# Patient Record
Sex: FEMALE | Race: WHITE | NOT HISPANIC OR LATINO | Employment: FULL TIME
[De-identification: names, ages, dates, MRNs, and addresses within clinical notes are randomized per-mention and may not be internally consistent; named-entity substitution may affect disease eponyms.]

---

## 2022-07-24 ENCOUNTER — HEALTH MAINTENANCE LETTER (OUTPATIENT)
Age: 27
End: 2022-07-24

## 2022-09-20 NOTE — PROGRESS NOTES
[facility]  Gynecology Consult Visit    SUBJECTIVE    Nadeen Rowe is a 27 year old nulligravid who is here for discussion of contraception, specifically permanent contraception/sterilization.     Nadeen is present today with her . They do not plan biologic children. She also has AUB. Prior to placement of a Mirena IUD in , she reports periods lasting 7-9 days with 4 days of heavy bleeding, and back pain. The menses have resolved with the placement of the IUD. However the IUD has caused loss of libido and weight gain, and apathy. She continues to have pelvic pain, which she describes as a stabbing pain in lower pelvis occurring monthly.    Her LMP is: No LMP recorded. (Menstrual status: IUD).    PAST MEDICAL HISTORY  Past Medical History:   Diagnosis Date     Factor 5 Leiden mutation, heterozygous (H)     no personal history of DVT     MEDICATIONS  No current outpatient medications on file.     No current facility-administered medications for this visit.       ALLERGIES  No Known Allergies    OBSTETRIC/GYNECOLOGIC HISTORY  Period cycle/length/flow/associated symptoms: None currently due to IUD. Prior to IUD, heavy bleeding and severe back pain.  Current contraception: Mirena IUD  Number of partners in last year: 1 male partner (monogamous)  History of STDs: None  No results found for: PAP 2019, normal. 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 Lives with her , Rg, who is  "a second year pediatric resident.     FAMILY HISTORY    Family History   Problem Relation Age of Onset     Other - See Comments Mother         prothrombin gene mutation     Miscarriages / Stillbirths Mother         4 miscarriages     Pulmonary Embolism Father         FVL homozygosity, on anticoagulation     REVIEW OF SYSTEMS  A 10 point review of systems including Constitutional, Eyes, Respiratory, Cardiovascular, Gastroenterology, Genitourinary, Integumentary, Musculoskeletal, and Psychiatric, were all negative, except for pertinent positives noted in the above HPI.    OBJECTIVE  /78   Pulse 84   Ht 1.702 m (5' 7\")   Wt 68 kg (150 lb)   BMI 23.49 kg/m      General: Alert, without distress  Pelvic: normal external female genitalia; normal vagina without discharge; normal cervix without lesions/masses;  anteverted, mobile, small but without descensus, nontender; adnexae nontender and without masses; normal anus/perineum  Extremities: normal      ASSESSMENT  Nadeen Rowe is a 27 year old nulligravid with AUB and dysmenorrhea currently incompletely managed with a progestin IUD who presents for consultation to discuss hysterectomy and permanent sterilization.    Nadeen is clear that she does not desire biologic children. She understands that sterilization may mean she regrets this choice in the future. She understands that other types of reversible contraception, like an IUD, provide equal contraceptive efficacy without the concomitant risk of surgery. Nadeen has a relative contraindication to estrogen-containing contraceptives (FVL heterozygosity), which limits the types of contraception available to manage menses.     Nadeen is also clear that she dislikes the side effects of the Mirena IUD and that the IUD has not completely resolved the pelvic pain she experiences monthly. Because she does not desire children, would rather not require the use of hormonal contraception for menstrual management for the remaining " decades of fertility. For this reason, she requests a hysterectomy for management of AUB and dysmenorrhea. We discussed alternatives to hysterectomy that are less invasive, including ablation with tubal ligation. We reviewed that an ablation may incompletely resolve heavy and/or painful menses (likely 30% or more given 20 years of remaining fertility/menses). For these reasons, Nadeen continued to prefer hysterectomy for treatment.    We discussed that I recommended removal of the uterus, cervix (to prevent future cervical cancers), and fallopian tubes (to prevent future ovarian cancers). Given her young age, I recommended that Nadeen keep both ovaries to prevent surgical menopause and its associated risks of osteoporosis and heart disease. She is in agreement.    Finally, we discussed route, and that I recommend the most minimally invasive approach to hysterectomy. Given her nulliparity and lack of descensus on exam, we discussed laparoscopic hysterectomy. We reviewed the risks of surgery, including: bleeding/transfusion, infection, injury to bowel/bladder/ureters/nerves/blood vessels requiring repair/reoperation for repair, DVT/PE, poor cosmesis of incisions. We signed the hysterectomy consent together today.      PLAN  -pelvic US for surgical planning. I will call Nadeen with results. She reports a normal pap smear in 2019; will attempt to acquire records prior to scheduling surgery    Nick Owen ([place]) MS3  [facility]    OBGYN Attending Addendum    I appreciate the note above by medical student, Feliz Owen.  I, Hilary Garnica, was present with the medical student, who participated in the service and in the documentation of the note. I have verified the history and personally performed the physical exam and medical decision making. I have edited accordingly and agree with the assessment and plan of care as documented in the note.    Hilary Garnica MD, MSCI    [facility]  9/28/22

## 2022-09-21 ASSESSMENT — ANXIETY QUESTIONNAIRES
7. FEELING AFRAID AS IF SOMETHING AWFUL MIGHT HAPPEN: NOT AT ALL
GAD7 TOTAL SCORE: 0
3. WORRYING TOO MUCH ABOUT DIFFERENT THINGS: NOT AT ALL
1. FEELING NERVOUS, ANXIOUS, OR ON EDGE: NOT AT ALL
5. BEING SO RESTLESS THAT IT IS HARD TO SIT STILL: NOT AT ALL
GAD7 TOTAL SCORE: 0
2. NOT BEING ABLE TO STOP OR CONTROL WORRYING: NOT AT ALL
4. TROUBLE RELAXING: NOT AT ALL
7. FEELING AFRAID AS IF SOMETHING AWFUL MIGHT HAPPEN: NOT AT ALL
GAD7 TOTAL SCORE: 0
6. BECOMING EASILY ANNOYED OR IRRITABLE: NOT AT ALL

## 2022-09-28 ENCOUNTER — OFFICE VISIT (OUTPATIENT)
Dept: OBGYN | Facility: CLINIC | Age: 27
End: 2022-09-28
Attending: OBSTETRICS & GYNECOLOGY
Payer: COMMERCIAL

## 2022-09-28 VITALS
SYSTOLIC BLOOD PRESSURE: 114 MMHG | WEIGHT: 150 LBS | HEIGHT: 67 IN | BODY MASS INDEX: 23.54 KG/M2 | DIASTOLIC BLOOD PRESSURE: 78 MMHG | HEART RATE: 84 BPM

## 2022-09-28 DIAGNOSIS — N94.6 DYSMENORRHEA: Primary | ICD-10-CM

## 2022-09-28 PROCEDURE — 99203 OFFICE O/P NEW LOW 30 MIN: CPT | Performed by: OBSTETRICS & GYNECOLOGY

## 2022-09-28 NOTE — LETTER
2022       RE: Nadeen Rowe  [address] [place] [address]     Dear Colleague,    Thank you for referring your patient, Nadeen Rowe, to the [facility] [place] at [facility]. Please see a copy of my visit note below.    [facility]  Gynecology Consult Visit    SUBJECTIVE    Nadeen Rowe is a 27 year old nulligravid who is here for discussion of contraception, specifically permanent contraception/sterilization.     Nadeen is present today with her . They do not plan biologic children. She also has AUB. Prior to placement of a Mirena IUD in , she reports periods lasting 7-9 days with 4 days of heavy bleeding, and back pain. The menses have resolved with the placement of the IUD. However the IUD has caused loss of libido and weight gain, and apathy. She continues to have pelvic pain, which she describes as a stabbing pain in lower pelvis occurring monthly.    Her LMP is: No LMP recorded. (Menstrual status: IUD).    PAST MEDICAL HISTORY  Past Medical History:   Diagnosis Date     Factor 5 Leiden mutation, heterozygous (H)     no personal history of DVT     MEDICATIONS  No current outpatient medications on file.     No current facility-administered medications for this visit.       ALLERGIES  No Known Allergies    OBSTETRIC/GYNECOLOGIC HISTORY  Period cycle/length/flow/associated symptoms: None currently due to IUD. Prior to IUD, heavy bleeding and severe back pain.  Current contraception: Mirena IUD  Number of partners in last year: 1 male partner (monogamous)  History of STDs: None  No results found for: PAP , normal. 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 Lives with her , Rg, who is a second year pediatric resident.     FAMILY HISTORY    Family History   Problem Relation Age of Onset     Other - See Comments Mother         prothrombin gene mutation     Miscarriages / Stillbirths Mother         4 miscarriages     Pulmonary Embolism Father         FVL homozygosity, on anticoagulation     REVIEW OF SYSTEMS  A 10 point review of systems including Constitutional, Eyes, Respiratory, Cardiovascular, Gastroenterology, Genitourinary, Integumentary, Musculoskeletal, and Psychiatric, were all negative, except for pertinent positives noted in the above HPI.    OBJECTIVE  /78   Pulse 84   Ht 1.702 m (5' 7\")   Wt 68 kg (150 lb)   BMI 23.49 kg/m      General: Alert, without distress  Pelvic: normal external female genitalia; normal vagina without discharge; normal cervix without lesions/masses;  anteverted, mobile, small but without descensus, nontender; adnexae nontender and without masses; normal anus/perineum  Extremities: normal      ASSESSMENT  Nadeen Rowe is a 27 year old nulligravid with AUB and dysmenorrhea currently incompletely managed with a progestin IUD who presents for consultation to discuss hysterectomy and permanent sterilization.    Nadeen is clear that she does not desire biologic children. She understands that sterilization may mean she regrets this choice in the future. She understands that other types of reversible contraception, like an IUD, provide equal contraceptive efficacy without the concomitant risk of surgery. Nadeen has a relative contraindication to estrogen-containing contraceptives (FVL heterozygosity), which limits the types of contraception available to " manage menses.     Nadeen is also clear that she dislikes the side effects of the Mirena IUD and that the IUD has not completely resolved the pelvic pain she experiences monthly. Because she does not desire children, would rather not require the use of hormonal contraception for menstrual management for the remaining decades of fertility. For this reason, she requests a hysterectomy for management of AUB and dysmenorrhea. We discussed alternatives to hysterectomy that are less invasive, including ablation with tubal ligation. We reviewed that an ablation may incompletely resolve heavy and/or painful menses (likely 30% or more given 20 years of remaining fertility/menses). For these reasons, Nadeen continued to prefer hysterectomy for treatment.    We discussed that I recommended removal of the uterus, cervix (to prevent future cervical cancers), and fallopian tubes (to prevent future ovarian cancers). Given her young age, I recommended that Nadeen keep both ovaries to prevent surgical menopause and its associated risks of osteoporosis and heart disease. She is in agreement.    Finally, we discussed route, and that I recommend the most minimally invasive approach to hysterectomy. Given her nulliparity and lack of descensus on exam, we discussed laparoscopic hysterectomy. We reviewed the risks of surgery, including: bleeding/transfusion, infection, injury to bowel/bladder/ureters/nerves/blood vessels requiring repair/reoperation for repair, DVT/PE, poor cosmesis of incisions. We signed the hysterectomy consent together today.      PLAN  -pelvic US for surgical planning. I will call Nadeen with results. She reports a normal pap smear in 2019; will attempt to acquire records prior to scheduling surgery    Nick Owen ([place]) MS3  [facility]    OBGYN Attending Addendum    I appreciate the note above by medical student, Feliz Owen.  I, Hilary Garnica, was present with the medical student, who participated in the  service and in the documentation of the note. I have verified the history and personally performed the physical exam and medical decision making. I have edited accordingly and agree with the assessment and plan of care as documented in the note.    Hilary Garnica MD, MSCI    [facility]  9/28/22        Again, thank you for allowing me to participate in the care of your patient.      Sincerely,    Hilary Garnica MD

## 2022-09-28 NOTE — LETTER
Date:October 3, 2022      Provider requested that no letter be sent. Do not send.       [facility]

## 2022-10-03 ENCOUNTER — HEALTH MAINTENANCE LETTER (OUTPATIENT)
Age: 27
End: 2022-10-03

## 2022-10-18 ENCOUNTER — ANCILLARY PROCEDURE (OUTPATIENT)
Dept: ULTRASOUND IMAGING | Facility: CLINIC | Age: 27
End: 2022-10-18
Attending: OBSTETRICS & GYNECOLOGY
Payer: COMMERCIAL

## 2022-10-18 DIAGNOSIS — N94.6 DYSMENORRHEA: ICD-10-CM

## 2022-10-18 PROCEDURE — 76830 TRANSVAGINAL US NON-OB: CPT

## 2022-10-18 PROCEDURE — 76830 TRANSVAGINAL US NON-OB: CPT | Mod: 26 | Performed by: OBSTETRICS & GYNECOLOGY

## 2022-10-25 DIAGNOSIS — N94.6 DYSMENORRHEA: ICD-10-CM

## 2022-10-25 DIAGNOSIS — Z30.2 REQUEST FOR STERILIZATION: ICD-10-CM

## 2022-10-25 DIAGNOSIS — N93.9 ABNORMAL UTERINE BLEEDING (AUB): Primary | ICD-10-CM

## 2022-10-25 RX ORDER — PHENAZOPYRIDINE HYDROCHLORIDE 100 MG/1
200 TABLET, FILM COATED ORAL ONCE
Status: CANCELLED | OUTPATIENT
Start: 2022-10-25 | End: 2022-10-25

## 2022-10-25 RX ORDER — ACETAMINOPHEN 325 MG/1
975 TABLET ORAL ONCE
Status: CANCELLED | OUTPATIENT
Start: 2022-10-25 | End: 2022-10-25

## 2022-10-25 NOTE — PROGRESS NOTES
Called Nadeen to review US (normal) and ensure plan for hysterectomy. She remains sure in her decision for permanent management of AUB/dysmenorrhea and sterilization.    Case request for [facility]/BS entered. Will get pre-op with [facility]/Dr. Blum.    Hilary Garnica MD, MSCI, FACOG    [facility]  October 25, 2022

## 2022-11-10 ENCOUNTER — TELEPHONE (OUTPATIENT)
Dept: OBGYN | Facility: CLINIC | Age: 27
End: 2022-11-10

## 2022-11-10 PROBLEM — N94.6 DYSMENORRHEA: Status: ACTIVE | Noted: 2022-11-10

## 2022-11-10 PROBLEM — N93.9 ABNORMAL UTERINE BLEEDING (AUB): Status: ACTIVE | Noted: 2022-11-10

## 2022-11-10 NOTE — TELEPHONE ENCOUNTER
Spoke with patient, scheduled surgery. Patient is aware of date, time, location, prep instructions, need for H&P within 30 days and covid test within 96 hours of surgery.     Type of surgery: HYSTERECTOMY, TOTAL, LAPAROSCOPIC, WITH BILATERAL SALPINGECTOMY  Location of surgery: [facility]  Date and time of surgery: 12/22/22 at 7:15am  Surgeon: Jennifer Kirkland MD  Pre-Op Appt Date: 12/2/22  Post-Op Appt Date: 1/4/23   Packet sent out: Yes  Pre-cert/Authorization completed:  Not Applicable  Date: 11/10/22    Gi Loving  Clinical Services Assistant

## 2022-11-10 NOTE — TELEPHONE ENCOUNTER
Left message for patient to call back and schedule surgery.    Gi Loving  Clinical Services Assistant

## 2022-11-30 ASSESSMENT — ENCOUNTER SYMPTOMS
HOARSE VOICE: 1
SORE THROAT: 1
WHEEZING: 0
TROUBLE SWALLOWING: 0
COUGH DISTURBING SLEEP: 1
TASTE DISTURBANCE: 0
SINUS CONGESTION: 1
COUGH: 1
NECK MASS: 0
HEMOPTYSIS: 0
SPUTUM PRODUCTION: 0
SMELL DISTURBANCE: 0
SINUS PAIN: 1
DYSPNEA ON EXERTION: 0
SNORES LOUDLY: 0
POSTURAL DYSPNEA: 0
SHORTNESS OF BREATH: 0

## 2022-12-01 PROBLEM — D68.51 FACTOR 5 LEIDEN MUTATION, HETEROZYGOUS (H): Status: ACTIVE | Noted: 2022-12-01

## 2022-12-01 NOTE — PATIENT INSTRUCTIONS
Take aspirin 81 mg twice daily for 3 weeks post surgery    Any worrisome signs or symptoms -- go to the ED   Preparing for Your Surgery  Getting started  A nurse will call you to review your health history and instructions. They will give you an arrival time based on your scheduled surgery time. Please be ready to share:  Your doctor's clinic name and phone number  Your medical, surgical, and anesthesia history  A list of allergies and sensitivities  A list of medicines, including herbal treatments and over-the-counter drugs  Whether the patient has a legal guardian (ask how to send us the papers in advance)  Please tell us if you're pregnant--or if there's any chance you might be pregnant. Some surgeries may injure a fetus (unborn baby), so they require a pregnancy test. Surgeries that are safe for a fetus don't always need a test, and you can choose whether to have one.   If you have a child who's having surgery, please ask for a copy of Preparing for Your Child's Surgery.    Preparing for surgery  Within 10 to 30 days of surgery: Have a pre-op exam (sometimes called an H&P, or History and Physical). This can be done at a clinic or pre-operative center.  If you're having a , you may not need this exam. Talk to your care team.  At your pre-op exam, talk to your care team about all medicines you take. If you need to stop any medicines before surgery, ask when to start taking them again.  We do this for your safety. Many medicines can make you bleed too much during surgery. Some change how well surgery (anesthesia) drugs work.  Call your insurance company to let them know you're having surgery. (If you don't have insurance, call [phone].)  Call your clinic if there's any change in your health. This includes signs of a cold or flu (sore throat, runny nose, cough, rash, fever). It also includes a scrape or scratch near the surgery site.  If you have questions on the day of surgery, call your hospital or  surgery center.  COVID testing  You may need to be tested for COVID-19 before having surgery. If so, we will give you instructions (or click here).  Eating and drinking guidelines  For your safety: Unless your surgeon tells you otherwise, follow the guidelines below.  Eat and drink as usual until 8 hours before you arrive for surgery. After that, no food or milk.  Drink clear liquids until 2 hours before you arrive. These are liquids you can see through, like water, Gatorade, and Propel Water. They also include plain black coffee and tea (no cream or milk), candy, and breath mints. You can spit out gum when you arrive.  If you drink alcohol: Stop drinking it the night before surgery.  If your care team tells you to take medicine on the morning of surgery, it's okay to take it with a sip of water.  Preventing infection  Shower or bathe the night before and morning of your surgery. Follow the instructions your clinic gave you. (If no instructions, use regular soap.)  Don't shave or clip hair near your surgery site. We'll remove the hair if needed.  Don't smoke or vape the morning of surgery. You may chew nicotine gum up to 2 hours before surgery. A nicotine patch is okay.  Note: Some surgeries require you to completely quit smoking and nicotine. Check with your surgeon.  Your care team will make every effort to keep you safe from infection. We will:  Clean our hands often with soap and water (or an alcohol-based hand rub).  Clean the skin at your surgery site with a special soap that kills germs.  Give you a special gown to keep you warm. (Cold raises the risk of infection.)  Wear special hair covers, masks, gowns and gloves during surgery.  Give antibiotic medicine, if prescribed. Not all surgeries need antibiotics.  What to bring on the day of surgery  Photo ID and insurance card  Copy of your health care directive, if you have one  Glasses and hearing aids (bring cases)  You can't wear contacts during  surgery  Inhaler and eye drops, if you use them (tell us about these when you arrive)  CPAP machine or breathing device, if you use them  A few personal items, if spending the night  If you have . . .  A pacemaker, ICD (cardiac defibrillator) or other implant: Bring the ID card.  An implanted stimulator: Bring the remote control.  A legal guardian: Bring a copy of the certified (court-stamped) guardianship papers.  Please remove any jewelry, including body piercings. Leave jewelry and other valuables at home.  If you're going home the day of surgery  You must have a responsible adult drive you home. They should stay with you overnight as well.  If you don't have someone to stay with you, and you aren't safe to go home alone, we may keep you overnight. Insurance often won't pay for this.  After surgery  If it's hard to control your pain or you need more pain medicine, please call your surgeon's office.  Questions?   If you have any questions for your care team, list them here: _________________________________________________________________________________________________________________________________________________________________________ ____________________________________ ____________________________________ ____________________________________  For informational purposes only. Not to replace the advice of your health care provider. Copyright   2003, 2019 [facility]. All rights reserved. Clinically reviewed by Macie Leslie MD. Message [employer] [id number] - REV 10/22.

## 2022-12-01 NOTE — PROGRESS NOTES
[facility] WOMEN'S CLINIC [facility] PROFESSIONAL BLDG  3RD FLR,  [address]  [facility] [address]  [facility] [address]  Phone: [phone]  Fax: [phone]  Pre-op Performing Provider: JOSELIN FARLEY    PREOPERATIVE EVALUATION:  Today's date: 12/2/2022    Nadeen Rowe is a 27 year old female who presents for a preoperative evaluation.    Surgical Information:  Surgery/Procedure: HYSTERECTOMY, TOTAL, LAPAROSCOPIC, WITH BILATERAL SALPINGECTOMY  Surgery Location: [facility]  Surgeon: Dr. Kirkland  Surgery Date: 12/22/2022  Time of Surgery: 7:15am  Where patient plans to recover: At home with family  Fax number for surgical facility: Note does not need to be faxed, will be available electronically in Epic.    Type of Anesthesia Anticipated: General    Assessment & Plan     The proposed surgical procedure is considered INTERMEDIATE risk.    Preop general physical exam  Dysmenorrhea  Abnormal uterine bleeding (AUB)  Plans for hysterectomy.     Factor 5 Leiden mutation, heterozygous (H)    Risks and Recommendations:  The patient has the following additional risks and recommendations for perioperative complications:  Anemia/Bleeding/Clotting:    - FVL heterozygous with no history of DVT or PE, discussed DVT prevention postoperatively with prophylactic enoxaparin, but discussed guidelines unclear when heterozygous (if FVL homozygous, would be strongly recommended). Nadeen declines enoxaparin DVT prophylaxis but agrees to baby aspirin 81 mg BID for 3 weeks post-op, and worrisome signs and symptoms of DVT/PE were discussed with Nadeen and she was instructed to present to the ED with concerning symptoms.     Medication Instructions:  Patient is on no chronic medications    RECOMMENDATION:  APPROVAL GIVEN to proceed with proposed procedure, without further diagnostic evaluation.    Subjective     HPI related to upcoming procedure:   Nadeen has AUB and pelvic pain. Plans for hysterectomy for definitive management as well  as permanent contraception.     1. What is your level of physical activity? Works out 5x/week  2. Do you have chest pain when you re physically active? No  3. Do you currently have a cold, bronchitis or symptoms of other respiratory (head and chest) infections? No - she did have a URI 2 weeks ago but improving  4. Do you have a cough, shortness of breath, or wheezing? A mild lingering cough from URI, but no SOB or wheezing    5. Have you ever had anemia or been told to take iron pills? No  6. Have you had any abnormal blood loss such as black, tarry or bloody stools, or abnormal vaginal bleeding? No (has heavy periods)  7. Have you ever had a blood transfusion? No  8. Are you willing to have a blood transfusion if it is medically needed before, during or after your surgery? Yes  9. Have you ever had a heart attack or stroke? No  10. Have you ever had surgery on your heart or blood vessels, such as a stent, coronary (heart) bypass, or surgery on an artery in the head, neck, heart or legs? No  11. Do you have a history of heart failure? No  12. Do you have sleep apnea, excessive snoring or daytime drowsiness? No    13. Do you or anyone in your family have a history of blood clots? Yes - father has a history of PE, factor V Leiden homozygous  14. Do you or anyone in your family have a serious bleeding problem, such as longlasting bleeding after surgeries or cuts? No  15. Have you or anyone in your family ever had problems with anesthesia (sedation for surgery)? Nadeen gets nauseated with anesthesia    16. Do you have any artificial heart valves or other implanted medical devices, such as a pacemaker, defibrillator or continuous glucose monitor? No  17. Do you have any artificial joints? No  18. Are you allergic to latex? No  19. Is there any chance that you may be pregnant? No    Preoperative Review of :   reviewed - no record of controlled substances prescribed.      ROS:   General Symptoms: No  Skin Symptoms:  "No  HENT Symptoms: Yes  EYE SYMPTOMS: No  HEART SYMPTOMS: No  LUNG SYMPTOMS: Yes  INTESTINAL SYMPTOMS: No  URINARY SYMPTOMS: No  GYNECOLOGIC SYMPTOMS: No  BREAST SYMPTOMS: No  SKELETAL SYMPTOMS: No  BLOOD SYMPTOMS: No  NERVOUS SYSTEM SYMPTOMS: No  MENTAL HEALTH SYMPTOMS: No  Congestion: Yes - from recent URI, improving  Voice hoarseness: Yes - from recent URI, improving  Tooth pain: No  Gum tenderness: No  Bleeding gums: No  Change in taste: No  Change in sense of smell: No  Dry mouth: No  Hearing aid used: No  Neck lump: No  Sputum or phlegm: No  Coughing up blood: No  Snoring: No  Difficulty breathing on exertion: No  Nighttime Cough: Yes - from recent URI, improving  Difficulty breathing when lying flat: No    Patient Active Problem List    Diagnosis Date Noted     Factor 5 Leiden mutation, heterozygous (H) 12/01/2022     Priority: Medium     Found during father's workup for PE, so Nadeen had testing and is heterozygous for mutation       Abnormal uterine bleeding (AUB) 11/10/2022     Priority: Medium     Added automatically from request for surgery [id number]       Dysmenorrhea 11/10/2022     Priority: Medium     Added automatically from request for surgery [id number]        Past Medical History:   Diagnosis Date     Factor 5 Leiden mutation, heterozygous (H)     no personal history of DVT     Past Surgical History:   Procedure Laterality Date     SOFT TISSUE SURGERY  2018    Ganglionic Cyst Removal     Current Outpatient Medications   Medication Sig Dispense Refill     levonorgestrel (MIRENA) 20 MCG/DAY IUD          No Known Allergies     Social History     Tobacco Use     Smoking status: Never     Smokeless tobacco: Never   Substance Use Topics     Alcohol use: Never       Objective     /71   Pulse 83   Ht 1.702 m (5' 7\")   Wt 69.8 kg (153 lb 14.4 oz)   BMI 24.10 kg/m      Physical Exam    GENERAL APPEARANCE: healthy, alert and no distress     EYES: EOMI, PERRL     HENT: ear canals and TM's normal and nose " and mouth without ulcers or lesions     NECK: no adenopathy, no asymmetry, masses, or scars and thyroid normal to palpation     RESP: lungs clear to auscultation - no rales, rhonchi or wheezes     CV: regular rates and rhythm, normal S1 S2, no S3 or S4 and no murmur, click or rub     ABDOMEN:  soft, nontender, no HSM or masses     MS: extremities normal- no gross deformities noted, no evidence of inflammation in joints, FROM in all extremities.     SKIN: no suspicious lesions or rashes     NEURO: Normal strength and tone, sensory exam grossly normal, mentation intact and speech normal     PSYCH: mentation appears normal. and affect normal/bright     LYMPHATICS: Mild anterior cervical adenopathy    Diagnostics:  No labs were ordered during this visit.   No EKG required, no history of coronary heart disease, significant arrhythmia, peripheral arterial disease or other structural heart disease.    Revised Cardiac Risk Index (RCRI):  The patient has the following serious cardiovascular risks for perioperative complications:   - No serious cardiac risks = 0 points     RCRI Interpretation: 0 points: Class I (very low risk - 0.4% complication rate)       Signed Electronically by: Ophelia Shi MD  Copy of this evaluation report is provided to requesting physician.

## 2022-12-02 ENCOUNTER — OFFICE VISIT (OUTPATIENT)
Dept: FAMILY MEDICINE | Facility: CLINIC | Age: 27
End: 2022-12-02
Attending: FAMILY MEDICINE
Payer: COMMERCIAL

## 2022-12-02 VITALS
HEIGHT: 67 IN | DIASTOLIC BLOOD PRESSURE: 71 MMHG | BODY MASS INDEX: 24.16 KG/M2 | SYSTOLIC BLOOD PRESSURE: 104 MMHG | HEART RATE: 83 BPM | WEIGHT: 153.9 LBS

## 2022-12-02 DIAGNOSIS — Z01.818 PREOP GENERAL PHYSICAL EXAM: Primary | ICD-10-CM

## 2022-12-02 DIAGNOSIS — N93.9 ABNORMAL UTERINE BLEEDING (AUB): ICD-10-CM

## 2022-12-02 DIAGNOSIS — N94.6 DYSMENORRHEA: ICD-10-CM

## 2022-12-02 DIAGNOSIS — D68.51 FACTOR 5 LEIDEN MUTATION, HETEROZYGOUS (H): ICD-10-CM

## 2022-12-02 PROCEDURE — G0463 HOSPITAL OUTPT CLINIC VISIT: HCPCS

## 2022-12-02 PROCEDURE — 99204 OFFICE O/P NEW MOD 45 MIN: CPT | Performed by: FAMILY MEDICINE

## 2022-12-09 ENCOUNTER — DOCUMENTATION ONLY (OUTPATIENT)
Dept: OBGYN | Facility: CLINIC | Age: 27
End: 2022-12-09

## 2022-12-09 NOTE — PROGRESS NOTES
Patient sent mychart with her pap smear-- NILM   Ok to proceed with hsyterectomy.   Jennifer Kirkland MD

## 2022-12-20 ENCOUNTER — ANESTHESIA EVENT (OUTPATIENT)
Dept: SURGERY | Facility: AMBULATORY SURGERY CENTER | Age: 27
End: 2022-12-20
Payer: COMMERCIAL

## 2022-12-22 ENCOUNTER — ANESTHESIA (OUTPATIENT)
Dept: SURGERY | Facility: AMBULATORY SURGERY CENTER | Age: 27
End: 2022-12-22
Payer: COMMERCIAL

## 2022-12-22 ENCOUNTER — HOSPITAL ENCOUNTER (OUTPATIENT)
Facility: AMBULATORY SURGERY CENTER | Age: 27
Discharge: HOME | End: 2022-12-22
Attending: OBSTETRICS & GYNECOLOGY
Payer: COMMERCIAL

## 2022-12-22 VITALS
RESPIRATION RATE: 17 BRPM | WEIGHT: 145 LBS | TEMPERATURE: 98 F | DIASTOLIC BLOOD PRESSURE: 67 MMHG | HEIGHT: 67 IN | HEART RATE: 101 BPM | SYSTOLIC BLOOD PRESSURE: 113 MMHG | BODY MASS INDEX: 22.76 KG/M2 | OXYGEN SATURATION: 98 %

## 2022-12-22 DIAGNOSIS — G89.18 ACUTE POST-OPERATIVE PAIN: ICD-10-CM

## 2022-12-22 DIAGNOSIS — N94.6 DYSMENORRHEA: ICD-10-CM

## 2022-12-22 DIAGNOSIS — Z90.710 S/P HYSTERECTOMY: Primary | ICD-10-CM

## 2022-12-22 DIAGNOSIS — N93.9 ABNORMAL UTERINE BLEEDING (AUB): ICD-10-CM

## 2022-12-22 DIAGNOSIS — Z30.2 REQUEST FOR STERILIZATION: ICD-10-CM

## 2022-12-22 LAB
BASOPHILS # BLD AUTO: 0.1 10E3/UL (ref 0–0.2)
BASOPHILS NFR BLD AUTO: 1 %
EOSINOPHIL # BLD AUTO: 0.2 10E3/UL (ref 0–0.7)
EOSINOPHIL NFR BLD AUTO: 2 %
ERYTHROCYTE [DISTWIDTH] IN BLOOD BY AUTOMATED COUNT: 12.8 % (ref 10–15)
HCG INTACT+B SERPL-ACNC: <1 MIU/ML
HCG UR QL: NEGATIVE
HCT VFR BLD AUTO: 39.7 % (ref 35–47)
HGB BLD-MCNC: 13.3 G/DL (ref 11.7–15.7)
IMM GRANULOCYTES # BLD: 0 10E3/UL
IMM GRANULOCYTES NFR BLD: 0 %
INTERNAL QC OK POCT: NORMAL
LYMPHOCYTES # BLD AUTO: 3.2 10E3/UL (ref 0.8–5.3)
LYMPHOCYTES NFR BLD AUTO: 34 %
MCH RBC QN AUTO: 28.2 PG (ref 26.5–33)
MCHC RBC AUTO-ENTMCNC: 33.5 G/DL (ref 31.5–36.5)
MCV RBC AUTO: 84 FL (ref 78–100)
MONOCYTES # BLD AUTO: 0.6 10E3/UL (ref 0–1.3)
MONOCYTES NFR BLD AUTO: 7 %
NEUTROPHILS # BLD AUTO: 5.4 10E3/UL (ref 1.6–8.3)
NEUTROPHILS NFR BLD AUTO: 56 %
NRBC # BLD AUTO: 0 10E3/UL
NRBC BLD AUTO-RTO: 0 /100
PLATELET # BLD AUTO: 216 10E3/UL (ref 150–450)
POCT KIT EXPIRATION DATE: NORMAL
POCT KIT LOT NUMBER: NORMAL
RBC # BLD AUTO: 4.71 10E6/UL (ref 3.8–5.2)
WBC # BLD AUTO: 9.5 10E3/UL (ref 4–11)

## 2022-12-22 PROCEDURE — 84702 CHORIONIC GONADOTROPIN TEST: CPT | Performed by: PATHOLOGY

## 2022-12-22 PROCEDURE — 88307 TISSUE EXAM BY PATHOLOGIST: CPT | Mod: 26 | Performed by: PATHOLOGY

## 2022-12-22 PROCEDURE — 58571 TLH W/T/O 250 G OR LESS: CPT

## 2022-12-22 PROCEDURE — 88307 TISSUE EXAM BY PATHOLOGIST: CPT | Mod: TC | Performed by: OBSTETRICS & GYNECOLOGY

## 2022-12-22 PROCEDURE — 85025 COMPLETE CBC W/AUTO DIFF WBC: CPT | Performed by: PATHOLOGY

## 2022-12-22 PROCEDURE — 81025 URINE PREGNANCY TEST: CPT | Performed by: PATHOLOGY

## 2022-12-22 PROCEDURE — 58571 TLH W/T/O 250 G OR LESS: CPT | Mod: GC | Performed by: OBSTETRICS & GYNECOLOGY

## 2022-12-22 RX ORDER — PROPOFOL 10 MG/ML
INJECTION, EMULSION INTRAVENOUS CONTINUOUS PRN
Status: DISCONTINUED | OUTPATIENT
Start: 2022-12-22 | End: 2022-12-22

## 2022-12-22 RX ORDER — CEFAZOLIN SODIUM 2 G/50ML
2 SOLUTION INTRAVENOUS SEE ADMIN INSTRUCTIONS
Status: DISCONTINUED | OUTPATIENT
Start: 2022-12-22 | End: 2022-12-22 | Stop reason: HOSPADM

## 2022-12-22 RX ORDER — GLYCOPYRROLATE 0.2 MG/ML
INJECTION, SOLUTION INTRAMUSCULAR; INTRAVENOUS PRN
Status: DISCONTINUED | OUTPATIENT
Start: 2022-12-22 | End: 2022-12-22

## 2022-12-22 RX ORDER — FENTANYL CITRATE 50 UG/ML
INJECTION, SOLUTION INTRAMUSCULAR; INTRAVENOUS PRN
Status: DISCONTINUED | OUTPATIENT
Start: 2022-12-22 | End: 2022-12-22

## 2022-12-22 RX ORDER — ONDANSETRON 2 MG/ML
4 INJECTION INTRAMUSCULAR; INTRAVENOUS EVERY 30 MIN PRN
Status: DISCONTINUED | OUTPATIENT
Start: 2022-12-22 | End: 2022-12-23 | Stop reason: HOSPADM

## 2022-12-22 RX ORDER — ACETAMINOPHEN 325 MG/1
975 TABLET ORAL ONCE
Status: COMPLETED | OUTPATIENT
Start: 2022-12-22 | End: 2022-12-22

## 2022-12-22 RX ORDER — PHENAZOPYRIDINE HYDROCHLORIDE 100 MG/1
200 TABLET, FILM COATED ORAL ONCE
Status: COMPLETED | OUTPATIENT
Start: 2022-12-22 | End: 2022-12-22

## 2022-12-22 RX ORDER — MEPERIDINE HYDROCHLORIDE 25 MG/ML
12.5 INJECTION INTRAMUSCULAR; INTRAVENOUS; SUBCUTANEOUS
Status: DISCONTINUED | OUTPATIENT
Start: 2022-12-22 | End: 2022-12-23 | Stop reason: HOSPADM

## 2022-12-22 RX ORDER — CEFAZOLIN SODIUM 2 G/50ML
2 SOLUTION INTRAVENOUS
Status: COMPLETED | OUTPATIENT
Start: 2022-12-22 | End: 2022-12-22

## 2022-12-22 RX ORDER — APREPITANT 40 MG/1
40 CAPSULE ORAL DAILY
Status: DISCONTINUED | OUTPATIENT
Start: 2022-12-22 | End: 2022-12-23 | Stop reason: HOSPADM

## 2022-12-22 RX ORDER — PROPOFOL 10 MG/ML
INJECTION, EMULSION INTRAVENOUS PRN
Status: DISCONTINUED | OUTPATIENT
Start: 2022-12-22 | End: 2022-12-22

## 2022-12-22 RX ORDER — OXYCODONE HYDROCHLORIDE 5 MG/1
5 TABLET ORAL EVERY 4 HOURS PRN
Status: DISCONTINUED | OUTPATIENT
Start: 2022-12-22 | End: 2022-12-23 | Stop reason: HOSPADM

## 2022-12-22 RX ORDER — IBUPROFEN 200 MG
800 TABLET ORAL ONCE
Status: DISCONTINUED | OUTPATIENT
Start: 2022-12-22 | End: 2022-12-23 | Stop reason: HOSPADM

## 2022-12-22 RX ORDER — ACETAMINOPHEN 325 MG/1
975 TABLET ORAL ONCE
Status: DISCONTINUED | OUTPATIENT
Start: 2022-12-22 | End: 2022-12-23 | Stop reason: HOSPADM

## 2022-12-22 RX ORDER — LIDOCAINE HYDROCHLORIDE 10 MG/ML
INJECTION, SOLUTION INFILTRATION; PERINEURAL PRN
Status: DISCONTINUED | OUTPATIENT
Start: 2022-12-22 | End: 2022-12-22

## 2022-12-22 RX ORDER — HYDROMORPHONE HYDROCHLORIDE 1 MG/ML
0.4 INJECTION, SOLUTION INTRAMUSCULAR; INTRAVENOUS; SUBCUTANEOUS EVERY 5 MIN PRN
Status: DISCONTINUED | OUTPATIENT
Start: 2022-12-22 | End: 2022-12-22 | Stop reason: HOSPADM

## 2022-12-22 RX ORDER — AMOXICILLIN 250 MG
1-2 CAPSULE ORAL 2 TIMES DAILY
Qty: 30 TABLET | Refills: 0 | Status: SHIPPED | OUTPATIENT
Start: 2022-12-22 | End: 2023-01-04

## 2022-12-22 RX ORDER — SODIUM CHLORIDE, SODIUM LACTATE, POTASSIUM CHLORIDE, CALCIUM CHLORIDE 600; 310; 30; 20 MG/100ML; MG/100ML; MG/100ML; MG/100ML
INJECTION, SOLUTION INTRAVENOUS CONTINUOUS
Status: DISCONTINUED | OUTPATIENT
Start: 2022-12-22 | End: 2022-12-22 | Stop reason: HOSPADM

## 2022-12-22 RX ORDER — SODIUM CHLORIDE, SODIUM LACTATE, POTASSIUM CHLORIDE, CALCIUM CHLORIDE 600; 310; 30; 20 MG/100ML; MG/100ML; MG/100ML; MG/100ML
INJECTION, SOLUTION INTRAVENOUS CONTINUOUS
Status: DISCONTINUED | OUTPATIENT
Start: 2022-12-22 | End: 2022-12-23 | Stop reason: HOSPADM

## 2022-12-22 RX ORDER — BUPIVACAINE HYDROCHLORIDE 2.5 MG/ML
INJECTION, SOLUTION INFILTRATION; PERINEURAL PRN
Status: DISCONTINUED | OUTPATIENT
Start: 2022-12-22 | End: 2022-12-22 | Stop reason: HOSPADM

## 2022-12-22 RX ORDER — IBUPROFEN 800 MG/1
800 TABLET, FILM COATED ORAL EVERY 6 HOURS PRN
Qty: 30 TABLET | Refills: 0 | Status: SHIPPED | OUTPATIENT
Start: 2022-12-22 | End: 2023-01-04

## 2022-12-22 RX ORDER — OXYCODONE HYDROCHLORIDE 5 MG/1
5 TABLET ORAL
Status: COMPLETED | OUTPATIENT
Start: 2022-12-22 | End: 2022-12-22

## 2022-12-22 RX ORDER — SCOLOPAMINE TRANSDERMAL SYSTEM 1 MG/1
1 PATCH, EXTENDED RELEASE TRANSDERMAL
Status: DISCONTINUED | OUTPATIENT
Start: 2022-12-22 | End: 2022-12-23 | Stop reason: HOSPADM

## 2022-12-22 RX ORDER — LIDOCAINE 40 MG/G
CREAM TOPICAL
Status: DISCONTINUED | OUTPATIENT
Start: 2022-12-22 | End: 2022-12-22 | Stop reason: HOSPADM

## 2022-12-22 RX ORDER — ACETAMINOPHEN 325 MG/1
975 TABLET ORAL EVERY 6 HOURS PRN
Qty: 50 TABLET | Refills: 0 | Status: SHIPPED | OUTPATIENT
Start: 2022-12-22

## 2022-12-22 RX ORDER — KETOROLAC TROMETHAMINE 30 MG/ML
INJECTION, SOLUTION INTRAMUSCULAR; INTRAVENOUS PRN
Status: DISCONTINUED | OUTPATIENT
Start: 2022-12-22 | End: 2022-12-22

## 2022-12-22 RX ORDER — ONDANSETRON 4 MG/1
4 TABLET, ORALLY DISINTEGRATING ORAL EVERY 30 MIN PRN
Status: DISCONTINUED | OUTPATIENT
Start: 2022-12-22 | End: 2022-12-23 | Stop reason: HOSPADM

## 2022-12-22 RX ORDER — FENTANYL CITRATE 50 UG/ML
25 INJECTION, SOLUTION INTRAMUSCULAR; INTRAVENOUS
Status: DISCONTINUED | OUTPATIENT
Start: 2022-12-22 | End: 2022-12-23 | Stop reason: HOSPADM

## 2022-12-22 RX ORDER — FENTANYL CITRATE 50 UG/ML
50 INJECTION, SOLUTION INTRAMUSCULAR; INTRAVENOUS EVERY 5 MIN PRN
Status: DISCONTINUED | OUTPATIENT
Start: 2022-12-22 | End: 2022-12-22 | Stop reason: HOSPADM

## 2022-12-22 RX ORDER — OXYCODONE HYDROCHLORIDE 5 MG/1
5 TABLET ORAL EVERY 6 HOURS PRN
Qty: 12 TABLET | Refills: 0 | Status: SHIPPED | OUTPATIENT
Start: 2022-12-22 | End: 2022-12-25

## 2022-12-22 RX ORDER — DEXAMETHASONE SODIUM PHOSPHATE 4 MG/ML
INJECTION, SOLUTION INTRA-ARTICULAR; INTRALESIONAL; INTRAMUSCULAR; INTRAVENOUS; SOFT TISSUE PRN
Status: DISCONTINUED | OUTPATIENT
Start: 2022-12-22 | End: 2022-12-22

## 2022-12-22 RX ORDER — ONDANSETRON 2 MG/ML
INJECTION INTRAMUSCULAR; INTRAVENOUS PRN
Status: DISCONTINUED | OUTPATIENT
Start: 2022-12-22 | End: 2022-12-22

## 2022-12-22 RX ORDER — OXYCODONE HYDROCHLORIDE 5 MG/1
10 TABLET ORAL EVERY 4 HOURS PRN
Status: DISCONTINUED | OUTPATIENT
Start: 2022-12-22 | End: 2022-12-23 | Stop reason: HOSPADM

## 2022-12-22 RX ORDER — HYDROMORPHONE HYDROCHLORIDE 1 MG/ML
0.2 INJECTION, SOLUTION INTRAMUSCULAR; INTRAVENOUS; SUBCUTANEOUS EVERY 5 MIN PRN
Status: DISCONTINUED | OUTPATIENT
Start: 2022-12-22 | End: 2022-12-22 | Stop reason: HOSPADM

## 2022-12-22 RX ORDER — FENTANYL CITRATE 50 UG/ML
25 INJECTION, SOLUTION INTRAMUSCULAR; INTRAVENOUS EVERY 5 MIN PRN
Status: DISCONTINUED | OUTPATIENT
Start: 2022-12-22 | End: 2022-12-22 | Stop reason: HOSPADM

## 2022-12-22 RX ADMIN — PHENAZOPYRIDINE HYDROCHLORIDE 200 MG: 100 TABLET, FILM COATED ORAL at 07:00

## 2022-12-22 RX ADMIN — FENTANYL CITRATE 100 MCG: 50 INJECTION, SOLUTION INTRAMUSCULAR; INTRAVENOUS at 07:35

## 2022-12-22 RX ADMIN — PROPOFOL 150 MG: 10 INJECTION, EMULSION INTRAVENOUS at 07:35

## 2022-12-22 RX ADMIN — OXYCODONE HYDROCHLORIDE 5 MG: 5 TABLET ORAL at 10:21

## 2022-12-22 RX ADMIN — KETOROLAC TROMETHAMINE 30 MG: 30 INJECTION, SOLUTION INTRAMUSCULAR; INTRAVENOUS at 09:22

## 2022-12-22 RX ADMIN — Medication 50 MG: at 07:35

## 2022-12-22 RX ADMIN — ACETAMINOPHEN 975 MG: 325 TABLET ORAL at 06:51

## 2022-12-22 RX ADMIN — GLYCOPYRROLATE 0.1 MG: 0.2 INJECTION, SOLUTION INTRAMUSCULAR; INTRAVENOUS at 07:28

## 2022-12-22 RX ADMIN — SCOLOPAMINE TRANSDERMAL SYSTEM 1 PATCH: 1 PATCH, EXTENDED RELEASE TRANSDERMAL at 07:00

## 2022-12-22 RX ADMIN — FENTANYL CITRATE 50 MCG: 50 INJECTION, SOLUTION INTRAMUSCULAR; INTRAVENOUS at 08:19

## 2022-12-22 RX ADMIN — PROPOFOL 150 MCG/KG/MIN: 10 INJECTION, EMULSION INTRAVENOUS at 07:35

## 2022-12-22 RX ADMIN — CEFAZOLIN SODIUM 2 G: 2 SOLUTION INTRAVENOUS at 07:28

## 2022-12-22 RX ADMIN — SODIUM CHLORIDE, SODIUM LACTATE, POTASSIUM CHLORIDE, CALCIUM CHLORIDE: 600; 310; 30; 20 INJECTION, SOLUTION INTRAVENOUS at 06:50

## 2022-12-22 RX ADMIN — ONDANSETRON 4 MG: 2 INJECTION INTRAMUSCULAR; INTRAVENOUS at 09:22

## 2022-12-22 RX ADMIN — APREPITANT 40 MG: 40 CAPSULE ORAL at 07:00

## 2022-12-22 RX ADMIN — GLYCOPYRROLATE 0.1 MG: 0.2 INJECTION, SOLUTION INTRAMUSCULAR; INTRAVENOUS at 07:32

## 2022-12-22 RX ADMIN — ONDANSETRON 4 MG: 4 TABLET, ORALLY DISINTEGRATING ORAL at 11:19

## 2022-12-22 RX ADMIN — ONDANSETRON 4 MG: 2 INJECTION INTRAMUSCULAR; INTRAVENOUS at 07:35

## 2022-12-22 RX ADMIN — DEXAMETHASONE SODIUM PHOSPHATE 8 MG: 4 INJECTION, SOLUTION INTRA-ARTICULAR; INTRALESIONAL; INTRAMUSCULAR; INTRAVENOUS; SOFT TISSUE at 07:35

## 2022-12-22 RX ADMIN — LIDOCAINE HYDROCHLORIDE 80 MG: 10 INJECTION, SOLUTION INFILTRATION; PERINEURAL at 07:35

## 2022-12-22 RX ADMIN — FENTANYL CITRATE 50 MCG: 50 INJECTION, SOLUTION INTRAMUSCULAR; INTRAVENOUS at 07:57

## 2022-12-22 RX ADMIN — PROPOFOL 200 MCG/KG/MIN: 10 INJECTION, EMULSION INTRAVENOUS at 08:28

## 2022-12-22 NOTE — ANESTHESIA CARE TRANSFER NOTE
Patient: Nadeen Rowe    Procedure: Procedure(s):  HYSTERECTOMY, TOTAL, LAPAROSCOPIC, WITH BILATERAL SALPINGECTOMY, removal of IUD       Diagnosis: Abnormal uterine bleeding (AUB) [N93.9]  Dysmenorrhea [N94.6]  Diagnosis Additional Information: No value filed.    Anesthesia Type:   General     Note:    Oropharynx: oral airway in place and spontaneously breathing  Level of Consciousness: drowsy  Oxygen Supplementation: room air    Independent Airway: airway patency satisfactory and stable  Dentition: dentition unchanged  Vital Signs Stable: post-procedure vital signs reviewed and stable  Report to RN Given: handoff report given  Patient transferred to: PACU    Handoff Report: Identifed the Patient, Identified the Reponsible Provider, Reviewed the pertinent medical history, Discussed the surgical course, Reviewed Intra-OP anesthesia mangement and issues during anesthesia, Set expectations for post-procedure period and Allowed opportunity for questions and acknowledgement of understanding      Vitals:  Vitals Value Taken Time   BP     Temp     Pulse     Resp     SpO2         Electronically Signed By: HERBER Crabtree CRNA  December 22, 2022  9:54 AM  
seen with resident  pt sent for low hgb and weakness/sob  recent cardioversion for afib  on Eliquis after taken off xarelto  pe as doc  seen with [facility] who rec admission for further evaluation  agree with care and dispo

## 2022-12-22 NOTE — DISCHARGE INSTRUCTIONS
[facility]  Home Care Following Anesthesia  For 24 hours after surgery:  Get plenty of rest.  A responsible adult must stay with you for at least 24 hours after you leave the surgery center.  Do not drive or use heavy equipment.  If you have weakness or tingling, don't drive or use heavy equipment until this feeling goes away.   Do not drink alcohol.   Avoid strenuous or risky activities.  Ask for help when climbing stairs.  You may feel lightheaded.  IF so, sit for a few minutes before standing.  Have someone help you get up.   If you have nausea (feel sick to your stomach): Drink only clear liquids such as apple juice, ginger ale, broth or 7-Up.  Rest may also help.  Be sure to drink enough fluids.  Move to a regular diet as you feel able.   You may have a slight fever.  Call the doctor if your fever is over 100 F (37.7 C) (taken under the tongue) or lasts longer than 24 hours.  You may have a dry mouth, a sore throat, muscle aches or trouble sleeping. These should go away after 24 hours.  Do not make important or legal decisions.   It is recommended to avoid smoking.   If you use hormonal birth control (such as the pill, patch, ring or implants):  You will need a second form of birth control for 7 days (condoms, a diaphragm or contraceptive foam).  While in the surgery center, you received a medicine called Sugammadex.  Hormonal birth control (such as the pill, patch, ring or implants) will not work as well for a week after taking this medicine.         Tips for taking pain medications  To get the best pain relief possible, remember these points:  Take pain medications as directed, before pain becomes severe.  Pain medication can upset your stomach: taking it with food may help.  Constipation is a common side effect of pain medication. Drink plenty of  fluids.  Eat foods high in fiber. Take a stool softener if recommended by your doctor or pharmacist.  Do not drink alcohol,  drive or operate machinery while taking pain medications.  Ask about other ways to control pain, such as with heat, ice or relaxation.    Tylenol/Acetaminophen Consumption  To help encourage the safe use of acetaminophen, the makers of TYLENOL  have lowered the maximum daily dose for single-ingredient Extra Strength TYLENOL  (acetaminophen) products sold in the [place] from 8 pills per day (4,000 mg) to 6 pills per day (3,000 mg). The dosing interval has also changed from 2 pills every 4-6 hours to 2 pills every 6 hours.  If you feel your pain relief is insufficient, you may take Tylenol/Acetaminophen in addition to your narcotic pain medication.   Be careful not to exceed 3,000 mg of Tylenol/Acetaminophen in a 24 hour period from all sources.  If you are taking extra strength Tylenol/acetaminophen (500 mg), the maximum dose is 6 tablets in 24 hours.  If you are taking regular strength acetaminophen (325 mg), the maximum dose is 9 tablets in 24 hours.    Call a doctor for any of the following:  Signs of infection (fever, growing tenderness at the surgery site, a large amount of drainage or bleeding, severe pain, foul-smelling drainage, redness, swelling).  It has been over 8 to 10 hours since surgery and you are still not able to urinate (pass water).  Headache for over 24 hours.  Numbness, tingling or weakness the day after surgery (if you had spinal anesthesia).  Signs of Covid-19 infection (temperature over 100 degrees, shortness of breath, cough, loss of taste/smell, generalized body aches, persistent headache, chills, sore throat, nausea/vomiting/diarrhea)  Your doctor is:       Dr. Jennifer Bocanegra, Gynecologic Oncology: [phone]               Or dial [phone] and ask for the resident on call for:  Gynecologic Oncology  For emergency care, call the:  [place] [facility]:  [phone] (TTY for hearing impaired: [phone])

## 2022-12-22 NOTE — OP NOTE
[facility]  Operative Note     Surgery Date:   2022     Surgeon:           Jennifer Kirkland MD     Assistants:       Karlee Carrera PGY4     Pre-op Diagnosis:         1. Abnormal uterine bleeding                                         2. Dysmenorrhea                                         3. Factor V Leiden heterozygote     Post-op Diagnosis:       1. Same, s/p procedure     Procedure:        Total laparoscopic hysterectomy, bilateral salpingectomy, Mirena IUD removal     Anesthesia:      GETA      EBL:     300 mL     IVF:       800 mL crystalloid     UOP:     100 mL yellow/orange urine      Drains: S/p Moyer Catheter      Specimens:      Uterus, cervix, bilateral fallopian tubes     Complications:             None apparent     Indications: Nadeen Rowe is a 27 year old  who presents with AUB and dysmenorrhea. She had Mirena IUD placed which did not improve pelvic pain. Estrogen was contraindicated in setting of FVL heterozygote. She did not desire future fertility, and desired definitive management with hysterectomy. The risks, benefits, and alternatives of the above procedure were discussed with the patient, and she agreed to proceed.      Findings:   Small mobile uterus on bimanual exam. On laparoscopy, no injury upon entry, normal upper abdominal survey. Normal appearing bilateral fallopian tubes, ovaries, and uterus. Bilateral ureters visualized vermiculating in pelvis away from surgical sites. Hemostasis of all surgical sites at conclusion. IUD removed intact.     Procedure:   The patient was taken to the OR where general endotracheal anesthesia was administered without complication. She was placed in a dorsal lithotomy position using yellow fin stirrups with arms tucked. Exam under anesthesia revealed the above listed findings. She was then prepped and draped in usual sterile fashion. After time out was completed, a moyer catheter was placed in the bladder and a  medium Vcare uterine manipulator was placed. Attention was then turned towards the abdomen. The umbilicus was injected with 0.25% marcaine and incised 5mm with a scalpel inferiorly. Veress need was inserted and entry into abdomen confirmed with saline drop test and opening pressure of 5mmHg. The abdomen was insufflated. The laparoscope was inserted into trochar and used to visualize abdominal entry. A 5 mm trocar was then inserted into the LLQ. 5mm RLQ port was placed under visualization.       Laparoscopic findings were as noted above. The patient was placed in Trendelenburg position. The bowel was swept out of the pelvis. The left fallopian tube was elevated and mesosalpinx was grasped, cauterized and incised until the tube remained on the cornua0. The left round ligament was transected followed by the utero-ovarian ligament. The anterior leaf of the broad ligament was isolated and incised towards the manipulator cup until bladder flap was partially developed.The uterine vessels were then cauterized and incised followed by parallel incision of cardinal ligament with Ligasure. Attention was then turned to the right adnexa.      The right fallopian tube was elevated and mesosalpinx was grasped, cauterized and incised until the tube remained attached only at10 the cornua. The right round ligament was transected followed by the utero-ovarian ligament. The anterior leaf of the broad ligament was isolated and incised towards the manipulator cuff until bladder flap was fully developed with sequential blunt and cautery dissection. The uterine vessels were then skelotonized, cauterized and incised. This was followed by parallel incision of cardinal ligament with Ligasure.      Colpotomy was then performed with the L hook. Occasional bites take with Ligasure to ensure hemostasis. The IUD was removed with the V-Care. The uterus, cervix, and bilateral fallopian tubes were removed from the vagina and sent to pathology. The  vaginal cuff was closed from below using 2x running 0-Vicryl sutures on UR 6. Attention turned again to laparoscopy, which revealed defect approximately 1cm at right aspect of cuff just medial to apex. Figure of eight with 0-Vicryl placed and closure of cuff confirmed on palpation and laparoscopically. The pelvis was then suctioned and noted to be hemostatic.      The patient was taken out of trendelenburg position. All ports were removed and pneumoperitoneum expelled. Skin was then sutured with 4-0 vicryl suture and exophin applied. The patient tolerated the procedure well. She was awoken from anesthesia without difficulty and was transported to PACU in a stable condition.  Dr. Kirkland was scrubbed and present for the entire duration of the procedure.    Karlee Carrera MD PGY4  Obstetrics & Gynecology  12/22/22     I was present and scrubbed for the entire surgery. I have reviewed and edited this note.   Jennifer Kirkland MD

## 2022-12-22 NOTE — ANESTHESIA PREPROCEDURE EVALUATION
Anesthesia Pre-Procedure Evaluation    Patient: Nadeen Rowe   MRN: [id number] : 1995        Procedure : Procedure(s):  HYSTERECTOMY, TOTAL, LAPAROSCOPIC, WITH BILATERAL SALPINGECTOMY          Past Medical History:   Diagnosis Date     Factor 5 Leiden mutation, heterozygous (H)     no personal history of DVT      Past Surgical History:   Procedure Laterality Date     SOFT TISSUE SURGERY  2018    Ganglionic Cyst Removal      No Known Allergies   Social History     Tobacco Use     Smoking status: Never     Smokeless tobacco: Never   Substance Use Topics     Alcohol use: Never      Wt Readings from Last 1 Encounters:   22 65.8 kg (145 lb)        Anesthesia Evaluation   Pt has had prior anesthetic.     History of anesthetic complications  - motion sickness and PONV.      ROS/MED HX  ENT/Pulmonary:  - neg pulmonary ROS     Neurologic:  - neg neurologic ROS     Cardiovascular:  - neg cardiovascular ROS     METS/Exercise Tolerance:     Hematologic: Comments: Factor V leiden      Musculoskeletal:       GI/Hepatic:  - neg GI/hepatic ROS  (-) GERD   Renal/Genitourinary:       Endo:       Psychiatric/Substance Use:       Infectious Disease:       Malignancy:       Other:            Physical Exam    Airway        Mallampati: II   TM distance: > 3 FB   Neck ROM: full   Mouth opening: > 3 cm    Respiratory Devices and Support         Dental  no notable dental history         Cardiovascular             Pulmonary                   OUTSIDE LABS:  CBC:   Lab Results   Component Value Date    WBC 9.5 2022    HGB 13.3 2022    HCT 39.7 2022     2022     BMP: No results found for: NA, POTASSIUM, CHLORIDE, CO2, BUN, CR, GLC  COAGS: No results found for: PTT, INR, FIBR  POC:   Lab Results   Component Value Date    HCG Negative 2022     HEPATIC: No results found for: ALBUMIN, PROTTOTAL, ALT, AST, GGT, ALKPHOS, BILITOTAL, BILIDIRECT, LIV  OTHER: No results found for: PH, LACT, A1C, DOUGLAS,  PHOS, MAG, LIPASE, AMYLASE, TSH, T4, T3, CRP, SED    Anesthesia Plan    ASA Status:  2   NPO Status:  NPO Appropriate    Anesthesia Type: General.     - Airway: ETT   Induction: Intravenous, Propofol.   Maintenance: Balanced.        Consents    Anesthesia Plan(s) and associated risks, benefits, and realistic alternatives discussed. Questions answered and patient/representative(s) expressed understanding.    - Discussed:     - Discussed with:  Patient      - Extended Intubation/Ventilatory Support Discussed: No.      - Patient is DNR/DNI Status: No         Postoperative Care    Pain management: IV analgesics, Oral pain medications, Multi-modal analgesia (Consented for possible rescue TAP block).   PONV prophylaxis: Ondansetron (or other 5HT-3), Dexamethasone or Solumedrol, Background Propofol Infusion, Aprepitant, Scopolamine patch     Comments:           H&P reviewed: Unable to attach H&P to encounter due to EHR limitations. H&P Update: appropriate H&P reviewed, patient examined. No interval changes since H&P (within 30 days).         Shahid Mcgowan MD

## 2022-12-22 NOTE — BRIEF OP NOTE
[facility]  Brief Operative Note     Surgery Date: 2022    Surgeon:  Jennifer Kirkland MD    Assistants:  Karlee Carrera PGY4    Pre-op Diagnosis:  1. Abnormal uterine bleeding     2. Dysmenorrhea     3. Factor V Leiden heterozygote    Post-op Diagnosis:  1. Same, s/p procedure    Procedure:  Total laparoscopic hysterectomy, bilateral salpingectomy, removal of Mirena IUD    Anesthesia: GETA     EBL:  50 mL    IVF:  800 mL crystalloid    UOP:   100 mL yellow/orange urine     Drains: S/p Shaikh Catheter     Specimens:  Uterus, cervix, bilateral fallopian tubes    Complications:  None apparent    Indications: Nadeen Rowe is a 27 year old  who presents with AUB and dysmenorrhea. She had Mirena IUD placed which did not improve pelvic pain. Estrogen was contraindicated in setting of FVL heterozygote. She did not desire future fertility, and desired definitive management with hysterectomy. The risks, benefits, and alternatives of the above procedure were discussed with the patient, and she agreed to proceed.     Findings:   Small mobile uterus on bimanual exam. On laparoscopy, no injury upon entry, normal upper abdominal survey. Normal appearing bilateral fallopian tubes, ovaries, and uterus. Bilateral ureters visualized vermiculating in pelvis not near surgical sites. Hemostasis of all surgical sites at conclusion.     Disposition:  Transferred in stable condition to PACU    Karlee Carrera MD PGY4  Obstetrics & Gynecology  22     Jennifer Kirkland MD

## 2022-12-22 NOTE — ANESTHESIA POSTPROCEDURE EVALUATION
Patient: Nadeen Rowe    Procedure: Procedure(s):  HYSTERECTOMY, TOTAL, LAPAROSCOPIC, WITH BILATERAL SALPINGECTOMY, removal of IUD       Anesthesia Type:  General    Note:  Disposition: Outpatient   Postop Pain Control: Uneventful            Sign Out: Well controlled pain   PONV: No   Neuro/Psych: Uneventful            Sign Out: Acceptable/Baseline neuro status   Airway/Respiratory: Uneventful            Sign Out: Acceptable/Baseline resp. status   CV/Hemodynamics: Uneventful            Sign Out: Acceptable CV status; No obvious hypovolemia; No obvious fluid overload   Other NRE: NONE   DID A NON-ROUTINE EVENT OCCUR? No           Last vitals:  Vitals Value Taken Time   /64 12/22/22 1022   Temp 36.7  C (98  F) 12/22/22 1022   Pulse 108 12/22/22 1022   Resp 18 12/22/22 1022   SpO2 97 % 12/22/22 1022       Electronically Signed By: Shahid Mcgowan MD  December 22, 2022  11:50 AM

## 2022-12-26 LAB
PATH REPORT.COMMENTS IMP SPEC: NORMAL
PATH REPORT.COMMENTS IMP SPEC: NORMAL
PATH REPORT.FINAL DX SPEC: NORMAL
PATH REPORT.GROSS SPEC: NORMAL
PATH REPORT.MICROSCOPIC SPEC OTHER STN: NORMAL
PATH REPORT.RELEVANT HX SPEC: NORMAL
PHOTO IMAGE: NORMAL

## 2023-01-04 ENCOUNTER — OFFICE VISIT (OUTPATIENT)
Dept: OBGYN | Facility: CLINIC | Age: 28
End: 2023-01-04
Attending: OBSTETRICS & GYNECOLOGY
Payer: COMMERCIAL

## 2023-01-04 VITALS
HEART RATE: 109 BPM | DIASTOLIC BLOOD PRESSURE: 79 MMHG | SYSTOLIC BLOOD PRESSURE: 118 MMHG | BODY MASS INDEX: 23.81 KG/M2 | HEIGHT: 67 IN | WEIGHT: 151.7 LBS

## 2023-01-04 DIAGNOSIS — N94.6 DYSMENORRHEA: Primary | ICD-10-CM

## 2023-01-04 PROBLEM — N93.9 ABNORMAL UTERINE BLEEDING (AUB): Status: RESOLVED | Noted: 2022-11-10 | Resolved: 2023-01-04

## 2023-01-04 PROCEDURE — 99024 POSTOP FOLLOW-UP VISIT: CPT | Performed by: OBSTETRICS & GYNECOLOGY

## 2023-01-04 PROCEDURE — G0463 HOSPITAL OUTPT CLINIC VISIT: HCPCS | Performed by: OBSTETRICS & GYNECOLOGY

## 2023-01-04 ASSESSMENT — ANXIETY QUESTIONNAIRES
5. BEING SO RESTLESS THAT IT IS HARD TO SIT STILL: NOT AT ALL
6. BECOMING EASILY ANNOYED OR IRRITABLE: NOT AT ALL
GAD7 TOTAL SCORE: 0
2. NOT BEING ABLE TO STOP OR CONTROL WORRYING: NOT AT ALL
GAD7 TOTAL SCORE: 0
1. FEELING NERVOUS, ANXIOUS, OR ON EDGE: NOT AT ALL
3. WORRYING TOO MUCH ABOUT DIFFERENT THINGS: NOT AT ALL
7. FEELING AFRAID AS IF SOMETHING AWFUL MIGHT HAPPEN: NOT AT ALL

## 2023-01-04 ASSESSMENT — PATIENT HEALTH QUESTIONNAIRE - PHQ9
SUM OF ALL RESPONSES TO PHQ QUESTIONS 1-9: 0
5. POOR APPETITE OR OVEREATING: NOT AT ALL

## 2023-01-04 NOTE — PROGRESS NOTES
"26 yo here for post op following TLH BS on 12/22/22. She is doing well, no pain, no issues eating or voiding, no ongoing vaginal bleeding or spotting.   Final pathology is reviewed in detail.     /79   Pulse 109   Ht 1.702 m (5' 7\")   Wt 68.8 kg (151 lb 11.2 oz)   BMI 23.76 kg/m    Appears well  Abdomen soft NT ND  Incisions CDI without erythema, edema, or exudate  LE wnl    A/P:  Normal post op exam following hysterectomy in the setting of Factor % Leiden mutation, dysmenorrhea, and AUB.  She will continue her ASA for a total of 3 weeks  RTC prn  No paps needed    Jennifer Kirkland MD    "

## 2023-01-04 NOTE — LETTER
"1/4/2023       RE: Nadeen Rowe  [address] [place] [address]     Dear Colleague,    Thank you for referring your patient, Nadeen Rowe, to the [facility]. Please see a copy of my visit note below.    26 yo here for post op following TLH BS on 12/22/22. She is doing well, no pain, no issues eating or voiding, no ongoing vaginal bleeding or spotting.   Final pathology is reviewed in detail.     /79   Pulse 109   Ht 1.702 m (5' 7\")   Wt 68.8 kg (151 lb 11.2 oz)   BMI 23.76 kg/m    Appears well  Abdomen soft NT ND  Incisions CDI without erythema, edema, or exudate  LE wnl    A/P:  Normal post op exam following hysterectomy in the setting of Factor % Leiden mutation, dysmenorrhea, and AUB.  She will continue her ASA for a total of 3 weeks  RTC prn  No paps needed    Jennifer Kirkland MD        Again, thank you for allowing me to participate in the care of your patient.      Sincerely,    Jennifer Kirkland MD      "

## 2023-01-04 NOTE — LETTER
Date:January 5, 2023      Provider requested that no letter be sent. Do not send.       [facility]

## 2023-08-13 ENCOUNTER — HEALTH MAINTENANCE LETTER (OUTPATIENT)
Age: 28
End: 2023-08-13

## 2024-10-06 ENCOUNTER — HEALTH MAINTENANCE LETTER (OUTPATIENT)
Age: 29
End: 2024-10-06

## (undated) DEVICE — Device

## (undated) DEVICE — SUCTION IRR STRYKERFLOW II W/TIP 250-070-520

## (undated) DEVICE — PREP CHLORAPREP 26ML TINTED ORANGE  260815

## (undated) DEVICE — KOH COLPOTOMIZER OCCLUDER  CPO-6

## (undated) DEVICE — ENDO TROCAR FIRST ENTRY KII FIOS Z-THRD 05X100MM CTF03

## (undated) DEVICE — SOL NACL 0.9% INJ 1000ML BAG 2B1324X

## (undated) DEVICE — SU VICRYL 3-0 SH 27" J316H

## (undated) DEVICE — ESU LIGASURE LAPAROSCOPIC BLUNT TIP SEALER 5MMX37CM LF1837

## (undated) DEVICE — ADH SKIN CLOSURE PREMIERPRO EXOFIN 1.0ML 3470

## (undated) DEVICE — JELLY LUBRICATING SURGILUBE 2OZ TUBE

## (undated) DEVICE — ENDO TROCAR SLEEVE KII Z-THREADED 12X100MM CTS22

## (undated) DEVICE — GLOVE PROTEXIS BLUE W/NEU-THERA 7.0  2D73EB70

## (undated) DEVICE — SUCTION MANIFOLD NEPTUNE 2 SYS 4 PORT 0702-020-000

## (undated) DEVICE — GLOVE PROTEXIS POWDER FREE SMT 6.5  2D72PT65X

## (undated) DEVICE — KIT PATIENT POSITIONING PIGAZZI LATEX FREE 40580

## (undated) DEVICE — PREP CHLORHEXIDINE 4% 4OZ (HIBICLENS) 57504

## (undated) DEVICE — SOL NACL 0.9% IRRIG 500ML BOTTLE 2F7123

## (undated) DEVICE — ESU ENDO SCISSORS 5MM CVD 5DCS

## (undated) DEVICE — RETR ELEV / UTERINE MANIPULATOR V-CARE MED CUP 60-6085-201A

## (undated) DEVICE — SU WND CLOSURE RELOAD VLOC 180 ABS 0 GREEN 8" VLOCA008L

## (undated) DEVICE — NDL INSUFFLATION 13GA 120MM C2201

## (undated) DEVICE — DEVICE ENDO STITCH APPLIER 10MM 173016

## (undated) DEVICE — LINEN TOWEL PACK X5 5464

## (undated) DEVICE — ENDO TROCAR SLEEVE KII Z-THREADED 05X100MM CTS02

## (undated) RX ORDER — ONDANSETRON 2 MG/ML
INJECTION INTRAMUSCULAR; INTRAVENOUS
Status: DISPENSED
Start: 2022-12-22

## (undated) RX ORDER — PROPOFOL 10 MG/ML
INJECTION, EMULSION INTRAVENOUS
Status: DISPENSED
Start: 2022-12-22

## (undated) RX ORDER — ACETAMINOPHEN 325 MG/1
TABLET ORAL
Status: DISPENSED
Start: 2022-12-22

## (undated) RX ORDER — FENTANYL CITRATE 50 UG/ML
INJECTION, SOLUTION INTRAMUSCULAR; INTRAVENOUS
Status: DISPENSED
Start: 2022-12-22

## (undated) RX ORDER — KETOROLAC TROMETHAMINE 30 MG/ML
INJECTION, SOLUTION INTRAMUSCULAR; INTRAVENOUS
Status: DISPENSED
Start: 2022-12-22

## (undated) RX ORDER — APREPITANT 40 MG/1
CAPSULE ORAL
Status: DISPENSED
Start: 2022-12-22

## (undated) RX ORDER — GLYCOPYRROLATE 0.2 MG/ML
INJECTION, SOLUTION INTRAMUSCULAR; INTRAVENOUS
Status: DISPENSED
Start: 2022-12-22

## (undated) RX ORDER — BUPIVACAINE HYDROCHLORIDE 2.5 MG/ML
INJECTION, SOLUTION EPIDURAL; INFILTRATION; INTRACAUDAL
Status: DISPENSED
Start: 2022-12-22

## (undated) RX ORDER — DEXAMETHASONE SODIUM PHOSPHATE 4 MG/ML
INJECTION, SOLUTION INTRA-ARTICULAR; INTRALESIONAL; INTRAMUSCULAR; INTRAVENOUS; SOFT TISSUE
Status: DISPENSED
Start: 2022-12-22

## (undated) RX ORDER — ONDANSETRON 4 MG/1
TABLET, ORALLY DISINTEGRATING ORAL
Status: DISPENSED
Start: 2022-12-22

## (undated) RX ORDER — CEFAZOLIN SODIUM 2 G/50ML
SOLUTION INTRAVENOUS
Status: DISPENSED
Start: 2022-12-22

## (undated) RX ORDER — SCOLOPAMINE TRANSDERMAL SYSTEM 1 MG/1
PATCH, EXTENDED RELEASE TRANSDERMAL
Status: DISPENSED
Start: 2022-12-22

## (undated) RX ORDER — OXYCODONE HYDROCHLORIDE 5 MG/1
TABLET ORAL
Status: DISPENSED
Start: 2022-12-22